# Patient Record
Sex: FEMALE | URBAN - METROPOLITAN AREA
[De-identification: names, ages, dates, MRNs, and addresses within clinical notes are randomized per-mention and may not be internally consistent; named-entity substitution may affect disease eponyms.]

---

## 2017-02-14 ENCOUNTER — CLINICAL SUPPORT (OUTPATIENT)
Dept: PEDIATRIC DEVELOPMENTAL SERVICES | Age: 22
End: 2017-02-14

## 2017-02-14 VITALS
SYSTOLIC BLOOD PRESSURE: 107 MMHG | DIASTOLIC BLOOD PRESSURE: 71 MMHG | RESPIRATION RATE: 16 BRPM | WEIGHT: 94.8 LBS | HEART RATE: 75 BPM | HEIGHT: 59 IN | BODY MASS INDEX: 19.11 KG/M2

## 2017-02-14 DIAGNOSIS — Z76.0 ENCOUNTER FOR MEDICATION REFILL: Primary | ICD-10-CM

## 2017-02-14 RX ORDER — METHYLPHENIDATE HYDROCHLORIDE 10 MG/1
10 TABLET ORAL DAILY
Qty: 30 TAB | Refills: 0 | Status: SHIPPED | OUTPATIENT
Start: 2017-04-12 | End: 2017-03-06 | Stop reason: SDUPTHER

## 2017-02-14 RX ORDER — METHYLPHENIDATE HYDROCHLORIDE 10 MG/1
10 TABLET ORAL DAILY
Qty: 30 TAB | Refills: 0 | Status: SHIPPED | OUTPATIENT
Start: 2017-03-13 | End: 2017-03-06 | Stop reason: SDUPTHER

## 2017-02-14 RX ORDER — METHYLPHENIDATE HYDROCHLORIDE 10 MG/1
10 TABLET ORAL DAILY
Qty: 30 TAB | Refills: 0 | Status: SHIPPED | OUTPATIENT
Start: 2017-02-14 | End: 2017-05-11 | Stop reason: SDUPTHER

## 2017-02-14 NOTE — PROGRESS NOTES
Patient seen for vital signs prior to refill prescriptions written by Dr. Dong Llamas. Vital signs stable. Refill prescriptions written and routed to Dr. Dong Llamas for review and signature.

## 2017-02-15 RX ORDER — ARIPIPRAZOLE 2 MG/1
2 TABLET ORAL 2 TIMES DAILY
Qty: 60 TAB | Refills: 2 | Status: SHIPPED | OUTPATIENT
Start: 2017-02-15 | End: 2017-05-11 | Stop reason: SDUPTHER

## 2017-02-15 RX ORDER — TRAZODONE HYDROCHLORIDE 50 MG/1
50 TABLET ORAL
Qty: 30 TAB | Refills: 3 | Status: SHIPPED | OUTPATIENT
Start: 2017-02-15 | End: 2017-05-11 | Stop reason: SDUPTHER

## 2017-03-06 ENCOUNTER — OFFICE VISIT (OUTPATIENT)
Dept: PEDIATRIC ENDOCRINOLOGY | Age: 22
End: 2017-03-06

## 2017-03-06 VITALS
WEIGHT: 94.6 LBS | HEART RATE: 92 BPM | DIASTOLIC BLOOD PRESSURE: 78 MMHG | BODY MASS INDEX: 19.07 KG/M2 | OXYGEN SATURATION: 100 % | HEIGHT: 59 IN | SYSTOLIC BLOOD PRESSURE: 129 MMHG

## 2017-03-06 DIAGNOSIS — R62.7 POOR WEIGHT GAIN IN ADULT: Primary | ICD-10-CM

## 2017-03-06 NOTE — LETTER
3/8/2017 5:00 PM 
 
Patient:  Servando Koehler YOB: 1995 Date of Visit: 3/6/2017 Dear James Canchola MD 
[address] [facility] [address] VIA Facsimile: [phone] 
 : Thank you for referring Ms. Sam Koehler to me for evaluation/treatment. Below are the relevant portions of my assessment and plan of care. Chief Complaint Patient presents with  
 Other Elevated prolactin Cc: 1. Elevated prolactin: resolved 2. Weight gain: sub optimal       
      3. ADD 4. Autism and anxiety HOPC: 1. Patient is 25year old followed for evaluation of poor weight gain. Since last visit parent reported no illness. She eats 3 meals and 1 snack per day 2. She drinks one boost per day. 3. Medication: Abilify for autism and anxiety 4. Other concerns:had elevated proalctin related to medication and is resolved. She has ADD and takes ritalin ROS: no bone pain, muscle cramps, no headache or visual problems, no abdominal pain, normal bowel movements,  Good energy, no weakness Visit Vitals  /78 (BP 1 Location: Left arm)  Pulse 92  
 Ht 4' 11.33\" (1.507 m)  Wt 94 lb 9.6 oz (42.9 kg)  LMP 02/20/2017  SpO2 100%  BMI 18.89 kg/m2 Neck is supple, no lymphadenopathy, no thyromegaly, no dark circles around the neck S1 s2 heard normal rhythm   Abdomen is nondistended Labs from last visit reviewed:  
Lab Results Component Value Date/Time TSH 1.210 03/14/2016 10:39 AM  
 
 
 
A/P:  
1. Elevated prolactin: resolved 2. Weight gain: sub optimal    : reviewed calorie goal and reviewed meal plan and handouts for high calorie finger food snacks and meals provided 3. ADD 4. Autism and anxiety 5. Elevated prolactin: resolved Growth chart reviewed. Labs reviewed,Follow up in 3-4 months. If you have questions, please do not hesitate to call me.   I look forward to following Ms. Koehler along with you. Sincerely, Camilla Stringer MD

## 2017-03-06 NOTE — PROGRESS NOTES
Cc: 1. Elevated prolactin: resolved        2. Weight gain: sub optimal              3. ADD        4. Autism and anxiety     HOPC:   1. Patient is 25year old followed for evaluation of poor weight gain. Since last visit parent reported no illness. She eats 3 meals and 1 snack per day   2. She drinks one boost per day. 3. Medication: Abilify for autism and anxiety 4. Other concerns:had elevated proalctin related to medication and is resolved. She has ADD and takes ritalin    ROS: no bone pain, muscle cramps, no headache or visual problems, no abdominal pain, normal bowel movements,  Good energy, no weakness    Visit Vitals    /78 (BP 1 Location: Left arm)    Pulse 92    Ht 4' 11.33\" (1.507 m)    Wt 94 lb 9.6 oz (42.9 kg)    LMP 02/20/2017    SpO2 100%    BMI 18.89 kg/m2       Neck is supple, no lymphadenopathy, no thyromegaly, no dark circles around the neck   S1 s2 heard normal rhythm   Abdomen is nondistended     Labs from last visit reviewed:   Lab Results   Component Value Date/Time    TSH 1.210 03/14/2016 10:39 AM         A/P:   1. Elevated prolactin: resolved        2. Weight gain: sub optimal    : reviewed calorie goal and reviewed meal plan and handouts for high calorie finger food snacks and meals provided          3. ADD        4. Autism and anxiety         5. Elevated prolactin: resolved    Growth chart reviewed. Labs reviewed,Follow up in 3-4 months.

## 2017-03-06 NOTE — MR AVS SNAPSHOT
Visit Information Date & Time Provider Department Dept. Phone Encounter #  
 3/6/2017 11:00 AM Manan Ortiz MD Pediatric End[facility]inology [facility] [id number] Your Appointments 5/11/2017  1:00 PM  
Follow Up with Dell Brown MD  
[address] [facility]) Appt Note: fu visit [address] [facility] [address]  
[phone] [address] [facility] [address] [place] [address] Upcoming Health Maintenance Date Due Hepatitis A Peds Age 1-18 (1 of 2 - Standard Series) 10/17/1998 DTaP/Tdap/Td series (1 - Tdap) 10/17/2004 HPV AGE 9Y-26Y (1 of 3 - Female 3 Dose Series) 10/17/2008 INFLUENZA AGE 9 TO ADULT 8/1/2016 Allergies as of 3/6/2017  Review Complete On: 3/6/2017 By: Spencer Ibrahim LPN Severity Noted Reaction Type Reactions Furadantin [Nitrofurantoin] High 03/14/2016   Systemic Swelling Current Immunizations  Never Reviewed No immunizations on file. Not reviewed this visit Vitals BP Pulse Height(growth percentile) Weight(growth percentile) LMP SpO2  
 129/78 (98 %/ 92 %)* (BP 1 Location: Left arm) 92 4' 11.33\" (1.507 m) (3 %, Z= -1.94) 94 lb 9.6 oz (42.9 kg) (1 %, Z= -2.32) 02/20/2017 100% BMI OB Status Smoking Status 18.89 kg/m2 (14 %, Z= -1.06) Having regular periods Never Smoker *BP percentiles are based on NHBPEP's 4th Report Growth percentiles are based on CDC 2-20 Years data. Vitals History BMI and BSA Data Body Mass Index Body Surface Area  
 18.89 kg/m 2 1.34 m 2 Preferred Pharmacy Pharmacy Name Phone [address], [address] [place] [phone] Your Updated Medication List  
  
   
This list is accurate as of: 3/6/17 11:42 AM.  Always use your most recent med list.  
  
  
  
  
 ARIPiprazole 2 mg tablet Commonly known as:  ABILIFY Take 1 Tab by mouth two (2) times a day. methylphenidate 10 mg tablet Commonly known as:  RITALIN Take 1 Tab by mouth daily. Max Daily Amount: 10 mg.  
  
 norethindrone-ethinyl estradiol-iron 1.5 mg-30 mcg (21)/75 mg (7) Tab Commonly known as:  Bautista Rajwinder FE1.5/30 Take 1 Tab by mouth daily. OTHER Speech and language evaluate and treat, assistive technology evaluation  
  
 traZODone 50 mg tablet Commonly known as:  [place] Askew Take 1 Tab by mouth nightly. Introducing \[facility]\"" & HEALTH SERVICES! Hoa Magana introduces [employer] patient portal. Now you can access parts of your medical record, email your doctor's office, and request medication refills online. 1. In your internet browser, go to [email or web address] 2. Click on the First Time User? Click Here link in the Sign In box. You will see the New Member Sign Up page. 3. Enter your Muzy Access Code exactly as it appears below. You will not need to use this code after youve completed the sign-up process. If you do not sign up before the expiration date, you must request a new code. · [employer] Access Code: [email or web address] Expires: 3/14/2017 12:09 PM 
 
4. Enter the last four digits of your Social Security Number (xxxx) and Date of Birth (mm/dd/yyyy) as indicated and click Submit. You will be taken to the next sign-up page. 5. Create a Muzy ID. This will be your Muzy login ID and cannot be changed, so think of one that is secure and easy to remember. 6. Create a Muzy password. You can change your password at any time. 7. Enter your Password Reset Question and Answer. This can be used at a later time if you forget your password. 8. Enter your e-mail address. You will receive e-mail notification when new information is available in [address] 9. Click Sign Up. You can now view and download portions of your medical record. 10. Click the Download Summary menu link to download a portable copy of your medical information. If you have questions, please visit the Frequently Asked Questions section of the [employer] website. Remember, [employer] is NOT to be used for urgent needs. For medical emergencies, dial 911. Now available from your iPhone and Android! Please provide this summary of care documentation to your next provider. Your primary care clinician is listed as ALMITA Henry. If you have any questions after today's visit, please call [phone].

## 2017-05-11 ENCOUNTER — OFFICE VISIT (OUTPATIENT)
Dept: PEDIATRIC DEVELOPMENTAL SERVICES | Age: 22
End: 2017-05-11

## 2017-05-11 VITALS
BODY MASS INDEX: 20.36 KG/M2 | HEART RATE: 86 BPM | HEIGHT: 59 IN | WEIGHT: 101 LBS | DIASTOLIC BLOOD PRESSURE: 75 MMHG | OXYGEN SATURATION: 100 % | SYSTOLIC BLOOD PRESSURE: 118 MMHG

## 2017-05-11 DIAGNOSIS — F84.0 AUTISM SPECTRUM DISORDER: Primary | ICD-10-CM

## 2017-05-11 DIAGNOSIS — F79 INTELLECTUAL DISABILITY: ICD-10-CM

## 2017-05-11 RX ORDER — METHYLPHENIDATE HYDROCHLORIDE 10 MG/1
10 TABLET ORAL DAILY
Qty: 90 TAB | Refills: 0 | Status: SHIPPED | OUTPATIENT
Start: 2017-05-11

## 2017-05-11 RX ORDER — ARIPIPRAZOLE 2 MG/1
2 TABLET ORAL 2 TIMES DAILY
Qty: 120 TAB | Refills: 3 | Status: SHIPPED | OUTPATIENT
Start: 2017-05-11

## 2017-05-11 RX ORDER — TRAZODONE HYDROCHLORIDE 50 MG/1
50 TABLET ORAL
Qty: 90 TAB | Refills: 3 | Status: SHIPPED | OUTPATIENT
Start: 2017-05-11

## 2017-05-11 NOTE — PROGRESS NOTES
Developmental and Special Needs Pediatrics  Follow-Up Visit    [address]   [address] [place] [address], [facility], [address]  P: [phone]  F: [phone]                 GUARDIANS PRESENT:   Mom    MEDICATIONS:   Outpatient Encounter Prescriptions as of 5/11/2017   Medication Sig Dispense Refill    ARIPiprazole (ABILIFY) 2 mg tablet Take 1 Tab by mouth two (2) times a day. 60 Tab 2    traZODone (DESYREL) 50 mg tablet Take 1 Tab by mouth nightly. 30 Tab 3    methylphenidate (RITALIN) 10 mg tablet Take 1 Tab by mouth daily. Max Daily Amount: 10 mg. 30 Tab 0    norethindrone-ethinyl estradiol-iron (MICROGESTIN FE1.5/30) 1.5 mg-30 mcg (21)/75 mg (7) tab Take 1 Tab by mouth daily.  OTHER Speech and language evaluate and treat, assistive technology evaluation 30 Units 30     No facility-administered encounter medications on file as of 5/11/2017. ALLERGIES:   Allergies as of 05/11/2017 - Review Complete 05/11/2017   Allergen Reaction Noted    Furadantin [nitrofurantoin] Swelling 03/14/2016       HPI:   Summary of Previous Visit:12/14/16  IMPRESSIONS: Bebeto Leos is a soraya 21yo girl with a history of autism and global developmental delay and autism, being seen in follow-up. .  1.  Autism Spectrum Disorder, moderate-severe.  Bebeto Leos has had this diagnosis since a very young age. She is receiving educational services through the [facility] and is making progress.    2.  Intellectual Disability- Shanta's parents have established guardianship. Bebeto Leos is involved in community job training programs. 3.  Behavioral Disorder- consisting of aggression, self-injurious behaviors, and elopement.  This is currently managed on Abilify 2mg in the morning, 1mg in the afternoon. She is also taking Ritalin 10mg daily. 4. Sleep Disorder- consisting of difficulty with sleep maintenance. Improved on Trazodone 50mg taken nightly.    5.  Very loving, supportive mother.  Claudio Easton  RECOMMENDATIONS: 1.  Continue Trazodone 50mg nightly. 2.  Continue community support programs.    3.  Continue Ritalin 10mg daily.         F/U:  3mo  If PCP is willing to take over Ritalin, 4-5mo      INTERVAL HISTORY AND CONCERNS:  - she is waking up at 2-3am, and mom stays with her on the sofa, otherwise she will walk back and forth. But when she falls asleep, she is in a very deep sleep. - Continues Abilify 2mg BID and Ritalin 10mg daily.     - Mom is working on getting the waiver, she has \"put it off\" She is working on getting internships through school. And plans to stay there until 22yo. - She learned to swim and HIPPO therapy     Nutrition:  Eating well, and is eating more caloric forms of food     SCHOOL: is doing well in school. Review of Systems     A complete review of systems was performed and is negative except for those mentioned in the HPI. Physical Exam     Vitals:  Visit Vitals    /75 (BP 1 Location: Left arm, BP Patient Position: Sitting)    Pulse 86    Ht 4' 11.09\" (1.501 m)    Wt 101 lb (45.8 kg)    SpO2 100%    BMI 20.33 kg/m2        4 %ile (Z= -1.72) based on CDC 2-20 Years weight-for-age data using vitals from 5/11/2017.   4' 11.09\" (1.501 m) (2 %, Z= -2.03, Source: CDC 2-20 Years)    General: Alert, active, NAD  HENT: mucous membranes moist, no head injury, no nasal discharge  Eyes: EOM intact, conj normal, no discharge  Neck: ROM normal  CV: rrr, no m/r/g   Pulm: effort normal, BS normal, no distress  Abdominal: soft, NTND, no HSM  Skin: warm, no rashes  Neuro: Tone: no abnormal posturing    Interview:  Smiled, waved. Vocalizations, compliant. Impression/Recommendations:      IMPRESSIONS: Jones Valera is a soraya 19yo girl with a history of autism and global developmental delay and autism, being seen in follow-up. .  1.  Autism Spectrum Disorder, moderate-severe.  Jones Valera has had this diagnosis since a very young age.  She is receiving educational services through the [facility] and is making progress.    2.  Intellectual Disability- Shanta's parents have established guardianship. Jesus Zavala is involved in community job training programs. 3.  Behavioral Disorder- consisting of aggression, self-injurious behaviors, and elopement.  This is currently managed on Abilify 2mg in the morning, 2mg in the afternoon. She is also taking Ritalin 10mg daily. 4. Sleep Disorder- consisting of difficulty with sleep maintenance. Improved on Trazodone 50mg taken nightly. 5.  Very loving, supportive mother.  Genie Kin  RECOMMENDATIONS:   1.  Continue Trazodone 50mg nightly. 2.  Continue community support programs.    3.  Continue Ritalin 10mg daily. 4.  Continue Abilify 2mg twice daily.          F/U:  Because  this practice will be closing in August as I transition to my practice in [place] full time, I have instructed mom to schedule with a local psychiatrist for ongoing medication refills. Bernadette Sparks MD  Developmental-Behavioral Pediatrician  [facility] Developmental and Special Needs Pediatrics       25 minutes were spent face-to-face with the patient and family; over 50% of which was spent educating and counseling about impression, recommendations, and management.    Time In: 2:05  Time Out: 2:30    CC:  PCP

## 2017-05-11 NOTE — MR AVS SNAPSHOT
Visit Information Date & Time Provider Department Dept. Phone Encounter #  
 5/11/2017  2:00 PM Sumeet Herrera MD [address] [place] [address] and Special Needs Pediatrics [phone] [id number] Your Appointments 6/7/2017  2:30 PM  
ESTABLISHED PATIENT with Tina Sutton MD  
[facility]inology [facility] Diabetes [address]) Appt Note: 3 month f/u - Poor Weight + Seeing RD  
 200 [facility] 301 [facility] 7 [id number] [phone] [address] [address]  
  
    
 6/7/2017  2:30 PM  
ESTABLISHED PATIENT with [address], [place] Pediatric Endocrinology and Diabetes [facility] ([address] [facility]) Appt Note: 3 month f/u - Poor Weight [address] 7 [phone] [phone] 2400 [facility] Upcoming Health Maintenance Date Due Hepatitis A Peds Age 1-18 (1 of 2 - Standard Series) 10/17/1998 DTaP/Tdap/Td series (1 - Tdap) 10/17/2004 HPV AGE 9Y-26Y (1 of 3 - Female 3 Dose Series) 10/17/2008 INFLUENZA AGE 9 TO ADULT 8/1/2017 Allergies as of 5/11/2017  Review Complete On: 5/11/2017 By: Sanya Schwab Severity Noted Reaction Type Reactions Furadantin [Nitrofurantoin] High 03/14/2016   Systemic Swelling Current Immunizations  Never Reviewed No immunizations on file. Not reviewed this visit You Were Diagnosed With   
  
 Codes Comments Autism spectrum disorder    -  Primary ICD-10-CM: F84.0 ICD-9-CM: 299.00 Intellectual disability     ICD-10-CM: F79 
ICD-9-CM: 811 Vitals BP Pulse Height(growth percentile) Weight(growth percentile) 118/75 (87 %/ 88 %)* (BP 1 Location: Left arm, BP Patient Position: Sitting) 86 4' 11.09\" (1.501 m) (2 %, Z= -2.03) 101 lb (45.8 kg) (4 %, Z= -1.72) SpO2 BMI OB Status Smoking Status 100% 20.33 kg/m2 (33 %, Z= -0.45) Having regular periods Never Smoker *BP percentiles are based on NHBPEP's 4th Report Growth percentiles are based on CDC 2-20 Years data. Vitals History BMI and BSA Data Body Mass Index Body Surface Area  
 20.33 kg/m 2 1.38 m 2 Preferred Pharmacy Pharmacy Name Phone [facility], [address] [phone] Your Updated Medication List  
  
   
This list is accurate as of: 5/11/17  2:26 PM.  Always use your most recent med list.  
  
  
  
  
 ARIPiprazole 2 mg tablet Commonly known as:  ABILIFY Take 1 Tab by mouth two (2) times a day. methylphenidate 10 mg tablet Commonly known as:  RITALIN Take 1 Tab by mouth daily. Max Daily Amount: 10 mg.  
  
 norethindrone-ethinyl estradiol-iron 1.5 mg-30 mcg (21)/75 mg (7) Tab Commonly known as:  Bobbette Rook FE1.5/30 Take 1 Tab by mouth daily. OTHER Speech and language evaluate and treat, assistive technology evaluation  
  
 traZODone 50 mg tablet Commonly known as:  Alexia Salt Take 1 Tab by mouth nightly. Prescriptions Sent to Pharmacy Refills ARIPiprazole (ABILIFY) 2 mg tablet 3 Sig: Take 1 Tab by mouth two (2) times a day. Class: Normal  
 [facility] [address] Ph #: [phone] Route: Oral  
 traZODone (DESYREL) 50 mg tablet 3 Sig: Take 1 Tab by mouth nightly. Class: Normal  
 [facility], [address] Ph #: [phone] Route: Oral  
  
 Please provide this summary of care documentation to your next provider. Your primary care clinician is listed as ALMITA Henry. If you have any questions after today's visit, please call [phone].

## 2017-05-11 NOTE — LETTER
5/11/2017 Patient:  Meenu Koehler YOB: 1995 Dear Parents and Medical Providers of Meenu Koehler, Thank you for allowing me to be involved in the care of [facility]. Below you will find the relevant portions of her most recent evaluation. Please do not hesitate to contact me with questions or concerns. Sincerely, Dolly Neighbor, MD 
Developmental-Behavioral Pediatrician [address] [facility] Special Needs Pediatric[facility] [address] [facility] [place] [address], [address] [facility], [address] Developmental and Special Needs Pediatrics Follow-Up Visit 
  
[place] [address]  
200 [facility], [address] [facility], [address] P: [phone] F: [phone] 
  
 
 
Vitals: 
    
Visit Vitals  /75 (BP 1 Location: Left arm, BP Patient Position: Sitting)  Pulse 86  Ht 4' 11.09\" (1.501 m)  Wt 101 lb (45.8 kg)  SpO2 100%  BMI 20.33 kg/m2 IMPRESSIONS: Jenna Tilley is a soraya 19yo girl with a history of autism and global developmental delay and autism, being seen in follow-up. [place] 1.  Autism Spectrum Disorder, moderate-severe.  Jenna Tilley has had this diagnosis since a very young age. She is receiving educational services through the [facility] and is making progress.   
2.  Intellectual Disability- Shanta's parents have established guardianship. Jenna Tilley is involved in community job training programs. 3.  Behavioral Disorder- consisting of aggression, self-injurious behaviors, and elopement.  This is currently managed on Abilify 2mg in the morning, 2mg in the afternoon. She is also taking Ritalin 10mg daily. 4. Sleep Disorder- consisting of difficulty with sleep maintenance. Improved on Trazodone 50mg taken nightly. 5.  Very loving, supportive mother. 
Sasha Lopes 
RECOMMENDATIONS:  
1.  Continue Trazodone 50mg nightly. 2.  Continue community support programs.   
3.  Continue Ritalin 10mg daily. 4. Continue Abilify 2mg twice daily.    
   
F/U: 
Because this practice will be closing in August as I transition to my practice in [place] full time, I have instructed mom to schedule with a local psychiatrist for ongoing medication refills.  
  
Chad Parada MD 
Developmental-Behavioral Pediatrician [address] [place] [address] and Special Needs Pediatrics

## 2017-06-07 ENCOUNTER — OFFICE VISIT (OUTPATIENT)
Dept: PEDIATRIC ENDOCRINOLOGY | Age: 22
End: 2017-06-07

## 2017-06-07 VITALS
DIASTOLIC BLOOD PRESSURE: 81 MMHG | BODY MASS INDEX: 20.16 KG/M2 | OXYGEN SATURATION: 99 % | SYSTOLIC BLOOD PRESSURE: 121 MMHG | HEART RATE: 100 BPM | WEIGHT: 100 LBS | HEIGHT: 59 IN

## 2017-06-07 DIAGNOSIS — F90.9 ATTENTION DEFICIT HYPERACTIVITY DISORDER (ADHD), UNSPECIFIED ADHD TYPE: ICD-10-CM

## 2017-06-07 DIAGNOSIS — R62.51 POOR WEIGHT GAIN (0-17): Primary | ICD-10-CM

## 2017-06-07 NOTE — MR AVS SNAPSHOT
Visit Information Date & Time Provider Department Dept. Phone Encounter #  
 6/7/2017  2:30 PM Susie Francois MD Pediatric Endocrinology [facility] [phone] [id number] Your Appointments 10/18/2017  2:30 PM  
ESTABLISHED PATIENT with Susie Francois MD  
Pediatric Endocrinology and Diabetes [facility]oc - [facility]) Appt Note: 4 mo f.u seeing eddie [address] [place] [address] At [address] [place] 7 [phone] [address] [place] [address] Upcoming Health Maintenance Date Due Hepatitis A Peds Age 1-18 (1 of 2 - Standard Series) 10/17/1998 DTaP/Tdap/Td series (1 - Tdap) 10/17/2004 HPV AGE 9Y-26Y (1 of 3 - Female 3 Dose Series) 10/17/2008 INFLUENZA AGE 9 TO ADULT 8/1/2017 Allergies as of 6/7/2017  Review Complete On: 6/7/2017 By: Joel Salazar LPN Severity Noted Reaction Type Reactions Furadantin [Nitrofurantoin] High 03/14/2016   Systemic Swelling Current Immunizations  Never Reviewed No immunizations on file. Not reviewed this visit Vitals BP Pulse Height(growth percentile) Weight(growth percentile) 121/81 (92 %/ 96 %)* (BP 1 Location: Left arm, BP Patient Position: Sitting) 100 4' 11.17\" (1.503 m) (2 %, Z= -2.00) 100 lb (45.4 kg) (4 %, Z= -1.81) SpO2 BMI OB Status Smoking Status 99% 20.08 kg/m2 (29 %, Z= -0.55) Having regular periods Never Smoker *BP percentiles are based on NHBPEP's 4th Report Growth percentiles are based on CDC 2-20 Years data. BMI and BSA Data Body Mass Index Body Surface Area 20.08 kg/m 2 1.38 m 2 Preferred Pharmacy Pharmacy Name Phone [facility] [address] [phone] Your Updated Medication List  
  
   
This list is accurate as of: 6/7/17  3:16 PM.  Always use your most recent med list.  
  
  
  
  
 ARIPiprazole 2 mg tablet Commonly known as:  ABILIFY Take 1 Tab by mouth two (2) times a day. methylphenidate 10 mg tablet Commonly known as:  RITALIN Take 1 Tab by mouth daily. Max Daily Amount: 10 mg.  
  
 norethindrone-ethinyl estradiol-iron 1.5 mg-30 mcg (21)/75 mg (7) Tab Commonly known as:  Kely Gary FE1.5/30 Take 1 Tab by mouth daily. OTHER Speech and language evaluate and treat, assistive technology evaluation  
  
 traZODone 50 mg tablet Commonly known as:  [place] Take 1 Tab by mouth nightly. Please provide this summary of care documentation to your next provider. Your primary care clinician is listed as ALMITA Henry. If you have any questions after today's visit, please call [phone].

## 2017-06-07 NOTE — LETTER
6/7/2017 4:26 PM 
 
Patient:  Lonie Nissen Delgado-Castro YOB: 1995 Date of Visit: 6/7/2017 Dear Bell Rey MD 
[address] [facility] [address] VIA Facsimile: [phone] 
 : Thank you for referring Ms. Miguelito Koehler to me for evaluation/treatment. Below are the relevant portions of my assessment and plan of care. Chief Complaint Patient presents with  
 Other Weight NUTRITION ENCOUNTER Chief Complaint Patient presents with  
 Other Weight ASSESSMENT Raul Tejeda  is a 25 y.o. female who presents for nutritional evaluation of poor weight gain. Accompanied today by her mother. Family has done very well in making sure Miguelito Espinoza is getting regular meals and snacks. Her weight increased +6 lbs since her initial endocrine consult on 3/6/2017. Father had also been documenting her food intake (see scanned in Media) which has also helped keep her at goal.  
 
 
 
Subjective Estimated body mass index is 20.08 kg/(m^2) as calculated from the following: 
  Height as of this encounter: 4' 11.17\" (1.503 m). Weight as of this encounter: 100 lb (45.4 kg). IBW:  43 Kg; 95 Lbs  
%IBW:  105% BMR: 9652 kcals/day EER: 1429 kcals/day ALONSO for Healthy Weight:  1600 kcals/day Food Recall Results:   
 AM - egg, sausage, waffle Snack - banana, apples & PB, yogurt Lunch - TV dinners (praveena steak or pasta entrees) PM - pork chops or chicken, rice, mashed potato, or pasta; loves all kinds of meat HS - sometimes has a second helping of dinner Beverages - Pediasure given after evening meal; juice with lunch, also plain water Objective No results found for: HBA1C, HGBE8, VMG2JXEB, [id number] No results found for: GLU No results found for: CHOL, CHOLPOCT, CHOLX, CHLST, CHOLV, HDL, HDLPOC, LDL, LDLCPOC, NLDLCT, DLDL, LDLC, DLDLP, TGLX, TRIGL, TRIGP, TGLPOCT Allergies: Allergies Allergen Reactions  Furadantin [Nitrofurantoin] Swelling Medications: 
 
Current Outpatient Prescriptions:   ARIPiprazole (ABILIFY) 2 mg tablet, Take 1 Tab by mouth two (2) times a day., Disp: 120 Tab, Rfl: 3 
  traZODone (DESYREL) 50 mg tablet, Take 1 Tab by mouth nightly., Disp: 90 Tab, Rfl: 3 
  methylphenidate (RITALIN) 10 mg tablet, Take 1 Tab by mouth daily. Max Daily Amount: 10 mg., Disp: 90 Tab, Rfl: 0 
  OTHER, Speech and language evaluate and treat, assistive technology evaluation, Disp: 30 Units, Rfl: 30 
  norethindrone-ethinyl estradiol-iron (MICROGESTIN FE1.5/30) 1.5 mg-30 mcg (21)/75 mg (7) tab, Take 1 Tab by mouth daily. , Disp: , Rfl:  
 
 
 
DIAGNOSIS History of weight loss related to medication change evidenced by -20 lb weight loss after switching medications to manage behavior. INTERVENTION Nutrition Education & Recommendation: · Strategies for boosting calories to currently-accepted food choices  
 (re: adding extra cheese, butter or oil, nuts & nut butters, avocado, hummus, evaporated milk, eggs, gravy, and condiments) · Provide at least 1 nutritional supplement per day (re: Boost, Ensure, [place] Breakfast Essentials) · Recipes provided for high-calorie, high-protein shakes & smoothies to incorporate with nutritional supplements · Avoid caloric beverages between meal, offering only water to foster increased appetite at meal times I have discussed the intended plan with the patient as reported above. The patient has received educational handouts and questions were answered. MONITORING/EVALUATION Follow up appointment scheduled in 4 months. Reassess needs based on successful lifestyle changes and progress with nutrition recommendations. Start time: 1440 End Time: 4483 Total Time: 35 minutes Jazmín PRESLEY [address] [facility], [address] [place] [address], [facility] Cc: 1. Elevated prolactin: resolved 2. Weight gain: sub optimal  
3. ADD 4.  Autism and anxiety  
  
HOPC:  
 1.Patient is 25year old followed for evaluation of poor weight gain. Since last visit mom reported no illness. She is eating better and her portion are better and getting boost everyday, has 3 meals and 1 snack per day 2. She has ADD and is on ritalin and is on Abilify for autism and anxiety 3. Other concerns:had elevated proalctin related to medication and is resolved.   
ROS: no bone pain, muscle cramps, no headache or visual problems, no abdominal pain, normal bowel movements, Good energy, no weakness Visit Vitals  /81 (BP 1 Location: Left arm, BP Patient Position: Sitting)  Pulse 100  
 Ht 4' 11.17\" (1.503 m)  Wt 100 lb (45.4 kg)  SpO2 99%  BMI 20.08 kg/m2 Neck is supple, no lymphadenopathy, no thyromegaly, no dark circles around the neck S1 s2 heard normal rhythm  Abdomen is soft, no striae, normla bowel sounds, no tenderness Labs from last visit reviewed:  
Lab Results Component Value Date/Time TSH 1.210 03/14/2016 10:39 AM  
 
 
 
A/P:  
1. Elevated prolactin: resolved 2. Weight gain: sub optimal and done well with diet over last few months 3. ADD on ritalin and doing well 4. Autism and anxiety and is on Abilify doing well 
  
Follow up in 4 months and if she continues to do well can discharge from clinic. If you have questions, please do not hesitate to call me. I look forward to following Ms. Koehler along with you. Sincerely, Shawn Kevin MD

## 2017-06-07 NOTE — PROGRESS NOTES
NUTRITION ENCOUNTER      Chief Complaint   Patient presents with    Other     Weight        ASSESSMENT  Shanta Velázquez  is a 25 y.o. female who presents for nutritional evaluation of poor weight gain. Accompanied today by her mother. Family has done very well in making sure Ansley Kern is getting regular meals and snacks. Her weight increased +6 lbs since her initial endocrine consult on 3/6/2017. Father had also been documenting her food intake (see scanned in Media) which has also helped keep her at goal.         Subjective  Estimated body mass index is 20.08 kg/(m^2) as calculated from the following:    Height as of this encounter: 4' 11.17\" (1.503 m). Weight as of this encounter: 100 lb (45.4 kg). IBW:  43 Kg; 95 Lbs   %IBW:  105%    BMR: 1263 kcals/day  EER: 1429 kcals/day  ALONSO for Healthy Weight:  1600 kcals/day        Food Recall Results:     AM - egg, sausage, waffle   Snack - banana, apples & PB, yogurt   Lunch - TV dinners (praveena steak or pasta entrees)   PM - pork chops or chicken, rice, mashed potato, or pasta; loves all kinds of meat   HS - sometimes has a second helping of dinner   Beverages - Pediasure given after evening meal; juice with lunch, also plain water        Objective  No results found for: HBA1C, HGBE8, FNU6THCO, [id number]     No results found for: GLU     No results found for: CHOL, CHOLPOCT, CHOLX, CHLST, CHOLV, HDL, HDLPOC, LDL, LDLCPOC, NLDLCT, DLDL, LDLC, DLDLP, TGLX, TRIGL, TRIGP, TGLPOCT    Allergies: Allergies   Allergen Reactions    Furadantin [Nitrofurantoin] Swelling     Medications:    Current Outpatient Prescriptions:     ARIPiprazole (ABILIFY) 2 mg tablet, Take 1 Tab by mouth two (2) times a day., Disp: 120 Tab, Rfl: 3    traZODone (DESYREL) 50 mg tablet, Take 1 Tab by mouth nightly., Disp: 90 Tab, Rfl: 3    methylphenidate (RITALIN) 10 mg tablet, Take 1 Tab by mouth daily.  Max Daily Amount: 10 mg., Disp: 90 Tab, Rfl: 0    OTHER, Speech and language evaluate and treat, assistive technology evaluation, Disp: 30 Units, Rfl: 30    norethindrone-ethinyl estradiol-iron (MICROGESTIN FE1.5/30) 1.5 mg-30 mcg (21)/75 mg (7) tab, Take 1 Tab by mouth daily. , Disp: , Rfl:         DIAGNOSIS    History of weight loss related to medication change evidenced by -20 lb weight loss after switching medications to manage behavior. INTERVENTION    Nutrition Education & Recommendation:  · Strategies for boosting calories to currently-accepted food choices    (re: adding extra cheese, butter or oil, nuts & nut butters, avocado, hummus, evaporated milk, eggs, gravy, and condiments)  · Provide at least 1 nutritional supplement per day (re: Boost, Ensure, [place] Breakfast Essentials)  · Recipes provided for high-calorie, high-protein shakes & smoothies to incorporate with nutritional supplements  · Avoid caloric beverages between meal, offering only water to foster increased appetite at meal times    I have discussed the intended plan with the patient as reported above. The patient has received educational handouts and questions were answered. MONITORING/EVALUATION  Follow up appointment scheduled in 4 months. Reassess needs based on successful lifestyle changes and progress with nutrition recommendations. Start time: 1440  End Time: 1515  Total Time: 35 minutes      Jazmín Guzman W [address]

## 2017-06-07 NOTE — PROGRESS NOTES
Cc:  1. Elevated prolactin: resolved  2. Weight gain: sub optimal   3. ADD  4. Autism and anxiety      HOPC:   1. Patient is 25year old followed for evaluation of poor weight gain. Since last visit mom reported no illness. She is eating better and her portion are better and getting boost everyday, has 3 meals and 1 snack per day   2. She has ADD and is on ritalin and is on Abilify for autism and anxiety 3. Other concerns:had elevated proalctin related to medication and is resolved.    ROS: no bone pain, muscle cramps, no headache or visual problems, no abdominal pain, normal bowel movements, Good energy, no weakness    Visit Vitals    /81 (BP 1 Location: Left arm, BP Patient Position: Sitting)    Pulse 100    Ht 4' 11.17\" (1.503 m)    Wt 100 lb (45.4 kg)    SpO2 99%    BMI 20.08 kg/m2     Neck is supple, no lymphadenopathy, no thyromegaly, no dark circles around the neck   S1 s2 heard normal rhythm  Abdomen is soft, no striae, normla bowel sounds, no tenderness     Labs from last visit reviewed:   Lab Results   Component Value Date/Time    TSH 1.210 03/14/2016 10:39 AM         A/P:   1. Elevated prolactin: resolved  2. Weight gain: sub optimal and done well with diet over last few months  3. ADD on ritalin and doing well  4. Autism and anxiety and is on Abilify doing well     Follow up in 4 months and if she continues to do well can discharge from clinic.

## 2017-10-18 ENCOUNTER — OFFICE VISIT (OUTPATIENT)
Dept: PEDIATRIC ENDOCRINOLOGY | Age: 22
End: 2017-10-18

## 2017-10-18 VITALS
DIASTOLIC BLOOD PRESSURE: 79 MMHG | WEIGHT: 105.6 LBS | HEART RATE: 75 BPM | HEIGHT: 59 IN | OXYGEN SATURATION: 99 % | RESPIRATION RATE: 18 BRPM | SYSTOLIC BLOOD PRESSURE: 115 MMHG | TEMPERATURE: 97.6 F | BODY MASS INDEX: 21.29 KG/M2

## 2017-10-18 DIAGNOSIS — R62.51 POOR WEIGHT GAIN (0-17): Primary | ICD-10-CM

## 2017-10-18 NOTE — MR AVS SNAPSHOT
Visit Information Date & Time Provider Department Dept. Phone Encounter #  
 10/18/2017  2:30 PM Moira Mata MD [facility] [phone] [id number] Upcoming Health Maintenance Date Due Hepatitis A Peds Age 1-18 (1 of 2 - Standard Series) 10/17/1998 DTaP/Tdap/Td series (1 - Tdap) 10/17/2004 HPV AGE 9Y-26Y (1 of 3 - Female 3 Dose Series) 10/17/2008 INFLUENZA AGE 9 TO ADULT 8/1/2017 Allergies as of 10/18/2017  Review Complete On: 10/18/2017 By: Anum Singleton Severity Noted Reaction Type Reactions Furadantin [Nitrofurantoin] High 03/14/2016   Systemic Swelling Current Immunizations  Never Reviewed No immunizations on file. Not reviewed this visit Vitals BP Pulse Temp Resp Height(growth percentile) Weight(growth percentile) 115/79 (BP 1 Location: Left arm, BP Patient Position: Sitting) 75 97.6 °F (36.4 °C) (Axillary) 18 4' 11.17\" (1.503 m) 105 lb 9.6 oz (47.9 kg) LMP SpO2 BMI OB Status Smoking Status 10/04/2017 (Approximate) 99% 21.2 kg/m2 Having regular periods Never Smoker Vitals History BMI and BSA Data Body Mass Index Body Surface Area  
 21.2 kg/m 2 1.41 m 2 Preferred Pharmacy Pharmacy Name Phone [facility] [address] [phone] Your Updated Medication List  
  
   
This list is accurate as of: 10/18/17  3:12 PM.  Always use your most recent med list.  
  
  
  
  
 ARIPiprazole 2 mg tablet Commonly known as:  ABILIFY Take 1 Tab by mouth two (2) times a day. methylphenidate HCl 10 mg tablet Commonly known as:  RITALIN Take 1 Tab by mouth daily. Max Daily Amount: 10 mg.  
  
 norethindrone-ethinyl estradiol-iron 1.5 mg-30 mcg (21)/75 mg (7) Tab Commonly known as:  Johney Warrick FE1.5/30 Take 1 Tab by mouth daily. OTHER Speech and language evaluate and treat, assistive technology evaluation traZODone 50 mg tablet Commonly known as:  Arnoldo Everett Take 1 Tab by mouth nightly. Please provide this summary of care documentation to your next provider. Your primary care clinician is listed as ALMITA Henry. If you have any questions after today's visit, please call [phone].

## 2017-10-18 NOTE — PROGRESS NOTES
NUTRITION ENCOUNTER      Chief Complaint   Patient presents with    Weight Management     4 month follow up         [address] Koehler  is a 23 y.o. female who presents for nutritional follow up evaluation of poor weight gain. Accompanied today by her mother. Change in weight includes +5.6 lbs gained since last OV on 6/7/2017 which is now normal for her age and height. Subjective  Estimated body mass index is 21.2 kg/(m^2) as calculated from the following:    Height as of this encounter: 4' 11.17\" (1.503 m). Weight as of this encounter: 105 lb 9.6 oz (47.9 kg). Objective  No results found for: HBA1C, HGBE8, ODU1FKIK, VAV6PDPC     No results found for: GLU     No results found for: CHOL, CHOLPOCT, CHOLX, CHLST, CHOLV, HDL, HDLPOC, LDL, LDLCPOC, LDLC, DLDLP, TGLX, TRIGL, TRIGP, TGLPOCT    Allergies: Allergies   Allergen Reactions    Furadantin [Nitrofurantoin] Swelling     Medications:    Current Outpatient Prescriptions:     ARIPiprazole (ABILIFY) 2 mg tablet, Take 1 Tab by mouth two (2) times a day., Disp: 120 Tab, Rfl: 3    traZODone (DESYREL) 50 mg tablet, Take 1 Tab by mouth nightly., Disp: 90 Tab, Rfl: 3    methylphenidate (RITALIN) 10 mg tablet, Take 1 Tab by mouth daily. Max Daily Amount: 10 mg., Disp: 90 Tab, Rfl: 0    OTHER, Speech and language evaluate and treat, assistive technology evaluation, Disp: 30 Units, Rfl: 30    norethindrone-ethinyl estradiol-iron (MICROGESTIN FE1.5/30) 1.5 mg-30 mcg (21)/75 mg (7) tab, Take 1 Tab by mouth daily. , Disp: , Rfl:           DIAGNOSIS    History of weight loss related to medication change evidenced by -20 lb weight loss after switching medications to manage behavior.      INTERVENTION    Nutrition Education & Recommendation:  · Weight normalized no need to continue inclusion of additional calories  · Resume previous eating style of three balanced meals plus 1-2 snacks per day    I have discussed the intended plan with the patient as reported above. The patient has received educational handouts and questions were answered. MONITORING/EVALUATION  Follow up appointment scheduled PRN. Reassess needs based on successful lifestyle changes and progress with nutrition recommendations. Start time: 1445  End Time: 1500  Total Time: 15 minutes      Jazmín PRESLEY  [address]